# Patient Record
Sex: MALE
[De-identification: names, ages, dates, MRNs, and addresses within clinical notes are randomized per-mention and may not be internally consistent; named-entity substitution may affect disease eponyms.]

---

## 2024-07-08 PROBLEM — Z00.00 ENCOUNTER FOR PREVENTIVE HEALTH EXAMINATION: Status: ACTIVE | Noted: 2024-07-08

## 2024-08-08 ENCOUNTER — APPOINTMENT (OUTPATIENT)
Dept: ORTHOPEDIC SURGERY | Facility: CLINIC | Age: 84
End: 2024-08-08

## 2024-08-08 PROBLEM — Z78.9 CONSUMES ALCOHOL OCCASIONALLY: Status: ACTIVE | Noted: 2024-08-08

## 2024-08-08 PROBLEM — Z80.9 FAMILY HISTORY OF MALIGNANT NEOPLASM: Status: ACTIVE | Noted: 2024-08-08

## 2024-08-08 PROBLEM — Z85.9 HISTORY OF MALIGNANT NEOPLASM: Status: RESOLVED | Noted: 2024-08-08 | Resolved: 2024-08-08

## 2024-08-08 PROBLEM — Z78.9 NON-SMOKER: Status: ACTIVE | Noted: 2024-08-08

## 2024-08-08 PROCEDURE — 73110 X-RAY EXAM OF WRIST: CPT | Mod: 50

## 2024-08-08 PROCEDURE — 99204 OFFICE O/P NEW MOD 45 MIN: CPT

## 2024-08-08 NOTE — ASSESSMENT
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  I recommend an MRI to confirm my suspicion.  He is 83 years old and will be 84 shortly.  We could try 1/3 injection by me to ensure that it offers relief.  If not then surgical options include radioscapholunate fusion however at his age I doubt he would want to undergo this.  We discussed possible AIN PIN neurectomy for pain relief to try to expedite return to golf painlessly.  Will call him when I receive the result

## 2024-08-08 NOTE — CONSULT LETTER
[Dear  ___] : Dear  [unfilled], [Consult Letter:] : I had the pleasure of evaluating your patient, [unfilled]. [Please see my note below.] : Please see my note below. [Consult Closing:] : Thank you very much for allowing me to participate in the care of this patient.  If you have any questions, please do not hesitate to contact me. [Sincerely,] : Sincerely, [FreeTextEntry3] : Sukh Pope MD Co-Director [facility]

## 2024-08-08 NOTE — HISTORY OF PRESENT ILLNESS
[Right] : right hand dominant [FreeTextEntry1] : Patient presents with a left dorsal wrist pain which started about a year ago after he lifted a heavy box.  He has had 2 steroid injections the first 1 over a year ago and the most recent was given 6 months ago.  Both injections were given by 2 different doctors in [place] which may have helped. He complains of pain the most when playing golf.  Patient did OT for 3 months with minimal improvement.  Patient had an MRI done 4 to 5 months ago.  He has been unable to retrieve his images.  A letter was also sent to the facility by [facility]ad[facility] with no results.  He reports no history of gout or pseudogout although he has had a shoulder replacement and a knee replacement

## 2024-08-08 NOTE — PHYSICAL EXAM
[de-identified] : Tender over radial lunate joint.  No obvious synovitis or synovitis.  Good wrist range of motion including wrist extension 70 wrist flexion 70.  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

## 2024-08-15 ENCOUNTER — APPOINTMENT (OUTPATIENT)
Dept: ORTHOPEDIC SURGERY | Facility: CLINIC | Age: 84
End: 2024-08-15
Payer: MEDICARE

## 2024-08-15 DIAGNOSIS — M25.532 PAIN IN LEFT WRIST: ICD-10-CM

## 2024-08-15 PROCEDURE — 99442: CPT | Mod: 93

## 2024-08-16 PROBLEM — M25.532 LEFT WRIST PAIN: Status: ACTIVE | Noted: 2024-08-08

## 2024-09-05 ENCOUNTER — APPOINTMENT (OUTPATIENT)
Dept: ORTHOPEDIC SURGERY | Facility: CLINIC | Age: 84
End: 2024-09-05

## 2024-09-05 VITALS — RESPIRATION RATE: 16 BRPM | WEIGHT: 187 LBS | HEIGHT: 66 IN | BODY MASS INDEX: 30.05 KG/M2

## 2024-09-05 DIAGNOSIS — M25.532 PAIN IN LEFT WRIST: ICD-10-CM

## 2024-09-05 PROCEDURE — 99214 OFFICE O/P EST MOD 30 MIN: CPT | Mod: 25

## 2024-09-05 PROCEDURE — 20605 DRAIN/INJ JOINT/BURSA W/O US: CPT | Mod: LT

## 2024-09-05 NOTE — ASSESSMENT
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  He desired a third injection under informed consent sterile conditions the radiocarpal joint was injected with half cc of Kenalog 10 combined with 2% plain lidocaine.  Again if symptoms persist could repeat the injection and also discussed surgery which could entail radioscapholunate fusion or possible AIN\PIN neurectomy

## 2024-09-05 NOTE — PHYSICAL EXAM
[de-identified] : Tender over radial lunate joint.  No obvious synovitis or synovitis.  Good wrist range of motion including wrist extension 70 wrist flexion 70.  Nontender ulnar-sided wrist or distal ulna

## 2024-09-05 NOTE — HISTORY OF PRESENT ILLNESS
[Right] : right hand dominant [FreeTextEntry1] : Patient returns for follow up of left dorsal wrist pain.  He has had 2 steroid injections the first 1 over a year ago and the most recent was given 6 months ago. Both injections were given by 2 different doctors in [place] which may have helped. He's here for possible third injection.  Patient had an MRI on 8/12/24 demonstrating radiolunate arthritis widening of the scapholunate ligament ulnar subluxation of the lunate with respect to the ulna etc. More typical of inflammatory arthritis and not slack.

## 2024-09-05 NOTE — PHYSICAL EXAM
[de-identified] : Tender over radial lunate joint.  No obvious synovitis or synovitis.  Good wrist range of motion including wrist extension 70 wrist flexion 70.  Nontender ulnar-sided wrist or distal ulna